# Patient Record
Sex: FEMALE | Employment: FULL TIME | URBAN - METROPOLITAN AREA
[De-identification: names, ages, dates, MRNs, and addresses within clinical notes are randomized per-mention and may not be internally consistent; named-entity substitution may affect disease eponyms.]

---

## 2022-11-06 ENCOUNTER — PATIENT MESSAGE (OUTPATIENT)
Dept: OBGYN CLINIC | Age: 32
End: 2022-11-06

## 2022-11-11 ENCOUNTER — OFFICE VISIT (OUTPATIENT)
Dept: OBGYN CLINIC | Age: 32
End: 2022-11-11
Payer: MEDICAID

## 2022-11-11 VITALS — WEIGHT: 269.8 LBS | DIASTOLIC BLOOD PRESSURE: 77 MMHG | BODY MASS INDEX: 42.26 KG/M2 | SYSTOLIC BLOOD PRESSURE: 108 MMHG

## 2022-11-11 DIAGNOSIS — Z01.419 ENCOUNTER FOR GYNECOLOGICAL EXAMINATION (GENERAL) (ROUTINE) WITHOUT ABNORMAL FINDINGS: Primary | ICD-10-CM

## 2022-11-11 PROCEDURE — 99385 PREV VISIT NEW AGE 18-39: CPT | Performed by: OBSTETRICS & GYNECOLOGY

## 2022-11-11 RX ORDER — NORGESTIMATE AND ETHINYL ESTRADIOL 0.25-0.035
1 KIT ORAL DAILY
COMMUNITY
Start: 2022-08-12

## 2022-11-11 RX ORDER — NORGESTIMATE AND ETHINYL ESTRADIOL 0.25-0.035
1 KIT ORAL DAILY
Qty: 3 DOSE PACK | Refills: 4 | Status: SHIPPED | OUTPATIENT
Start: 2022-11-11 | End: 2023-02-09

## 2022-11-11 RX ORDER — NORGESTIMATE AND ETHINYL ESTRADIOL 0.25-0.035
1 KIT ORAL DAILY
Qty: 1 DOSE PACK | Refills: 4 | Status: CANCELLED | OUTPATIENT
Start: 2022-11-11

## 2022-11-11 NOTE — PROGRESS NOTES
[facility]  [email or web address]  [phone]    Los Mercedes MD, FACOG     Annual Gynecologic Exam:  [facility] <40  Chief Complaint   Patient presents with    Well Woman         Harshal Stout is a 29 y.o. [de-identified]  UNAVAILABLE female who presents for an annual well woman exam.  Patient's last menstrual period was 2022. Sherryle Moder She reports the following additional concerns: wants STD testing , if covered. Menstrual status:  She does not report dysmenorrhea/painful menses. She does not report heavy menses. She does not report irregular bleeding. Sexual history and Contraception:  Social History     Substance and Sexual Activity   Sexual Activity Yes    Partners: Male    Birth control/protection: Pill       She does not reports new sexual partner(s) in the last year. Preventive Medicine History:  Her most recent Pap smear result: normal was obtained in 2021  Her most recent HR HPV screen was Negative obtained in     She does not have a history of MELVIN 2, 3 or cervical cancer. Past Medical History:   Diagnosis Date    Psoriasis      OB History    Para Term  AB Living   0 0 0 0 0 0   SAB IAB Ectopic Molar Multiple Live Births   0 0 0 0 0 0     Past Surgical History:   Procedure Laterality Date    HX TONSILLECTOMY       History reviewed. No pertinent family history.   Social History     Socioeconomic History    Marital status:      Spouse name: Not on file    Number of children: Not on file    Years of education: Not on file    Highest education level: Not on file   Occupational History    Not on file   Tobacco Use    Smoking status: Never    Smokeless tobacco: Never   Vaping Use    Vaping Use: Every day    Substances: Nicotine   Substance and Sexual Activity    Alcohol use: Yes     Comment: social    Drug use: Never    Sexual activity: Yes     Partners: Male     Birth control/protection: Pill   Other Topics Concern    Not on file   Social History Narrative    Not on file     Social Determinants of Health     Financial Resource Strain: Not on file   Food Insecurity: Not on file   Transportation Needs: Not on file   Physical Activity: Not on file   Stress: Not on file   Social Connections: Not on file   Intimate Partner Violence: Not on file   Housing Stability: Not on file       Allergies   Allergen Reactions    Other Medication Unknown (comments)     Peanuts       Current Outpatient Medications   Medication Sig    Pauline 0.25-35 mg-mcg tab Take 1 Tablet by mouth daily. norgestimate-ethinyl estradioL (ORTHO-CYCLEN, SPRINTEC) 0.25-35 mg-mcg tab Take 1 Tablet by mouth daily for 90 days. FEXOFENADINE HCL (ALLEGRA PO) Take  by mouth. (Patient not taking: Reported on 11/11/2022)    METHYLPHENIDATE HCL (CONCERTA PO) Take  by mouth. (Patient not taking: Reported on 11/11/2022)     No current facility-administered medications for this visit. There is no problem list on file for this patient. Review of Systems - History obtained from the patient and patient filled out questionnaire   Constitutional/general, HEENT, CV, Resp, GI, MSK, Neuro, Psych, Heme/lymph, Skin, Breast ROS: no significant complaints except as noted on HPI    Physical Exam  Visit Vitals  /77   Wt 269 lb 12.8 oz (122.4 kg)   LMP 11/03/2022   BMI 42.26 kg/m²       Constitutional  Appearance: well-nourished, well developed, alert, in no acute distress    HENT  Head and Face: appears normal    Neck  Inspection/Palpation: normal appearance, no masses or tenderness  Lymph Nodes: no lymphadenopathy present  Thyroid: gland size normal, nontender, no nodules or masses present on palpation    Chest  Respiratory Effort: breathing unlabored  Auscultation: normal breath sounds    Cardiovascular  Heart:   Auscultation: regular rate and rhythm without murmur    Breasts  Inspection of Breasts: breasts symmetrical, no skin changes, no discharge present, nipple appearance normal, no skin retraction present  Palpation of Breasts and Axillae: no masses present on palpation, no breast tenderness  Axillary Lymph Nodes: no lymphadenopathy present    Gastrointestinal  Abdominal Examination: abdomen non-tender to palpation, normal bowel sounds, no masses present  Liver and spleen: no hepatomegaly present, spleen not palpable  Hernias: no hernias identified    Genitourinary  External Genitalia: normal appearance for age, no discharge present, no tenderness present, no inflammatory lesions present, no masses present  Vagina: normal vaginal vault without central or paravaginal defects, thin white discharge present, no inflammatory lesions present, no masses present  Bladder: non-tender to palpation  Urethra: appears normal  Cervix: normal   Uterus: normal size, shape and consistency  Adnexa: no adnexal tenderness present, no adnexal masses present  Perineum: perineum within normal limits, no evidence of trauma, no rashes or skin lesions present  Anus: anus within normal limits, no hemorrhoids present  Inguinal Lymph Nodes: no lymphadenopathy present    Skin  General Inspection: no rash, no lesions identified    Neurologic/Psychiatric  Mental Status:  Orientation: grossly oriented to person, place and time  Mood and Affect: mood normal, affect appropriate    Assessment:  29 y.o. [de-identified]  for well woman exam  Encounter Diagnosis   Name Primary? Encounter for gynecological examination (general) (routine) without abnormal findings Yes       Plan:  The patient was counseled about diet, exercise, healthy lifestyle  We discussed current pap smear and HR HPV testing guidelines. I recommended follow up one year for routine annual gynecologic exam or sooner prn  Handouts were given to the patient  I recommended follow up with a primary care physician for chronic medical problems and evaluation of non-gynecologic concerns and to please contact our office with any GYN questions or concerns.   I recommended testing per CDC guidelines and at patient request.   Discussed risks, benefits and alternatives of OCP/nuvaring/patch: including but not limited to dvt/pe/mi/cva/ca/gi risks and that smoking, increasing age and other health conditions can increase these risks. Disc options for STD testing, pt is going to check on coverage first. Hold for today. Folllow up:  [x] return for annual well woman exam in one year or sooner if she is having problems  [] follow up and ultrasound  [] 6 months  [] 3 months  [] 6 weeks   [] 1 month    Orders Placed This Encounter    norgestimate-ethinyl estradioL (ORTHO-CYCLEN, [facility]) 0.25-35 mg-mcg tab    PAP IG, APTIMA HPV AND RFX 16/18,45 ([id number])       No results found for any visits on 11/11/22.

## 2022-11-19 LAB
CYTOLOGIST CVX/VAG CYTO: ABNORMAL
CYTOLOGY CVX/VAG DOC CYTO: ABNORMAL
CYTOLOGY CVX/VAG DOC THIN PREP: ABNORMAL
CYTOLOGY HISTORY:: ABNORMAL
DX ICD CODE: ABNORMAL
DX ICD CODE: ABNORMAL
HPV GENOTYPE REFLEX: ABNORMAL
HPV I/H RISK 4 DNA CVX QL PROBE+SIG AMP: POSITIVE
Lab: ABNORMAL
OTHER STN SPEC: ABNORMAL
PATHOLOGIST CVX/VAG CYTO: ABNORMAL
STAT OF ADQ CVX/VAG CYTO-IMP: ABNORMAL

## 2022-11-20 NOTE — PROGRESS NOTES
Pap smear abnormal, recommend colposcopy within four weeks. Update PMH: date/cytology/HPV (pos/neg) if done  Notify patient if MyChart not read or not active. Rec premedicate with 600mg Ibuprofen, if no contraindication (for example: pregnancy/allergy).   TICKLE until colpo completed  Ascus hpv pos

## 2022-11-29 ENCOUNTER — TELEPHONE (OUTPATIENT)
Dept: OBGYN CLINIC | Age: 32
End: 2022-11-29

## 2022-11-29 NOTE — PROGRESS NOTES
Follow up call to Ms. Zoraida Thao - Pt verified self and birth date for privacy precautions. Patient was advised of results & need for colpo. Colpo scheduled for next avail 1/19/23  Advised I don't know if it will be covered by insurance, advised to reach out to her insurance herself, advised we would be providing appropriate dx codes to help coverage, advised the same regarding STD testing. Advised HPV is sexually transmitted but it is a virus & may lay dormant in her system for an unknown amount of time. Advised I did not believe there was a test to check & see who gave her the HPV. Advised ASCUS could be d/t inflammation, infection, hormonal change, benign variation. Reviewed colpo procedure & protocols with pt. Ms. Zoraida Thao acknowledged understanding and all questions were answered to patients satisfaction. No further questions or concerns at this time. Updated PMH & tickled for colpo.

## 2022-11-29 NOTE — TELEPHONE ENCOUNTER
Follow up call to MsMicheline Heaven Chiu - Pt verified self and birth date for privacy precautions. Patient was advised of results & need for colpo. Colpo scheduled for next avail 1/19/23  Advised I don't know if it will be covered by insurance, advised to reach out to her insurance herself, advised we would be providing appropriate dx codes to help coverage, advised the same regarding STD testing. Advised HPV is sexually transmitted but it is a virus & may lay dormant in her system for an unknown amount of time. Advised I did not believe there was a test to check & see who gave her the HPV. Advised ASCUS could be d/t inflammation, infection, hormonal change, benign variation. Reviewed colpo procedure & protocols with pt. Ms. Heaven Chiu acknowledged understanding and all questions were answered to patients satisfaction. No further questions or concerns at this time. Updated PMH & tickled for colpo.

## 2022-11-29 NOTE — TELEPHONE ENCOUNTER
----- Message from Alejandra Meyers MD sent at 11/19/2022 10:39 PM EST -----  Pap smear abnormal, recommend colposcopy within four weeks. Update PMH: date/cytology/HPV (pos/neg) if done  Notify patient if MyChart not read or not active. Rec premedicate with 600mg Ibuprofen, if no contraindication (for example: pregnancy/allergy).   TICKLE until colpo completed  Ascus hpv pos

## 2023-02-12 ENCOUNTER — PATIENT MESSAGE (OUTPATIENT)
Dept: OBGYN CLINIC | Age: 33
End: 2023-02-12

## 2023-05-21 RX ORDER — NORGESTIMATE AND ETHINYL ESTRADIOL 0.25-0.035
1 KIT ORAL DAILY
COMMUNITY
Start: 2022-08-12

## 2023-07-25 ENCOUNTER — TELEPHONE (OUTPATIENT)
Age: 33
End: 2023-07-25

## 2023-08-28 ENCOUNTER — PROCEDURE VISIT (OUTPATIENT)
Age: 33
End: 2023-08-28
Payer: COMMERCIAL

## 2023-08-28 VITALS — SYSTOLIC BLOOD PRESSURE: 119 MMHG | DIASTOLIC BLOOD PRESSURE: 74 MMHG | WEIGHT: 254 LBS | BODY MASS INDEX: 39.78 KG/M2

## 2023-08-28 DIAGNOSIS — Z20.2 STD EXPOSURE: ICD-10-CM

## 2023-08-28 DIAGNOSIS — Z11.3 VENEREAL DISEASE SCREENING: ICD-10-CM

## 2023-08-28 DIAGNOSIS — R87.810 ASCUS WITH POSITIVE HIGH RISK HPV CERVICAL: Primary | ICD-10-CM

## 2023-08-28 DIAGNOSIS — R87.610 ASCUS WITH POSITIVE HIGH RISK HPV CERVICAL: Primary | ICD-10-CM

## 2023-08-28 DIAGNOSIS — Z01.812 PRE-PROCEDURE LAB EXAM: ICD-10-CM

## 2023-08-28 PROCEDURE — 57454 BX/CURETT OF CERVIX W/SCOPE: CPT | Performed by: OBSTETRICS & GYNECOLOGY

## 2023-08-28 NOTE — PROGRESS NOTES
Luz Webb is a 34 y.o. female presents for a problem visit. Chief Complaint   Patient presents with    Colposcopy       Problems: Abnormal Pap       Patient's last menstrual period was 08/10/2023. Birth Control: OCP (estrogen/progesterone). Patient had abnormal pap 11/11/2022 ASCUS/HPV pos. Last Pap: abnormal obtained 1 year(s) ago  Not currently taking a prenatal or folic vitamin. Occasional tobacco user  History of STD? HPV positive  Age she became sexually active ? 14  How many life partners ? 10          Chart reviewed for the following:   Jermain DICKSON LPN, have reviewed the History, Physical and updated the Allergic reactions for [address] performed immediately prior to start of procedure:   Jermain DICKSON LPN, have performed the following reviews on Luz Webb prior to the start of the procedure:            * Patient was identified by name and date of birth   * Agreement on procedure being performed was verified  * Risks and Benefits explained to the patient  * Procedure site verified and marked as necessary  * Patient was positioned for comfort  * Consent was signed and verified     Time: 5135    Date of procedure: 8/28/2023    Procedure performed by: Amarilis Ho MD       Provider assisted by: Jermain Rosado LPN    Patient assisted by: self    How tolerated by patient: tolerated the procedure well with no complications    Post Procedural Pain Scale: 0 - No Hurt    Comments: none    Examination chaperoned by Jermain Rosado LPN.
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
## 2023-08-29 LAB
HBV SURFACE AG SER QL: <0.1 INDEX
HBV SURFACE AG SER QL: NEGATIVE
HIV 1+2 AB+HIV1 P24 AG SERPL QL IA: NONREACTIVE
HIV 1/2 RESULT COMMENT: NORMAL

## 2023-08-30 LAB
C TRACH RRNA SPEC QL NAA+PROBE: NEGATIVE
N GONORRHOEA RRNA SPEC QL NAA+PROBE: NEGATIVE
T PALLIDUM AB SER QL IA: NON REACTIVE
T VAGINALIS RRNA SPEC QL NAA+PROBE: NEGATIVE

## 2023-09-01 LAB
CPT BILLING CODE: NORMAL
DIAGNOSIS SYNOPSIS:: NORMAL
DX ICD CODE: NORMAL
DX ICD CODE: NORMAL
PATH REPORT.FINAL DX SPEC: NORMAL
PATH REPORT.GROSS SPEC: NORMAL
PATH REPORT.RELEVANT HX SPEC: NORMAL
PATH REPORT.SITE OF ORIGIN SPEC: NORMAL
PATHOLOGIST NAME: NORMAL
PAYMENT PROCEDURE: NORMAL

## 2023-11-30 ENCOUNTER — TELEPHONE (OUTPATIENT)
Age: 33
End: 2023-11-30

## 2023-11-30 RX ORDER — NORGESTIMATE AND ETHINYL ESTRADIOL 0.25-0.035
1 KIT ORAL DAILY
Qty: 1 PACKET | Refills: 1 | Status: SHIPPED | OUTPATIENT
Start: 2023-11-30

## 2023-11-30 NOTE — TELEPHONE ENCOUNTER
Patient was advised of MD sent prescription to her pharmacy to get her to her pharmacy    Patient verbalized understanding.

## 2023-11-30 NOTE — TELEPHONE ENCOUNTER
Two patient identifiers used      34year old patient last seen in the office on 11/11/2022 for ae    Patient calling for a refill of her ocp    Patient was advised of need for ae and was placed on the schedule to be seen on 1/29/2023 at 10:20am      Patient was advised of need to be seen every year to make sure she is still ok to be prescribed ocp  Patient was advised of need to keep appointment in order to get further refills    Prescription pended for amend/sign       Thank you

## 2024-01-15 RX ORDER — NORGESTIMATE AND ETHINYL ESTRADIOL 0.25-0.035
1 KIT ORAL DAILY
Qty: 28 TABLET | Refills: 1 | OUTPATIENT
Start: 2024-01-15

## 2024-01-26 SDOH — ECONOMIC STABILITY: FOOD INSECURITY: WITHIN THE PAST 12 MONTHS, THE FOOD YOU BOUGHT JUST DIDN'T LAST AND YOU DIDN'T HAVE MONEY TO GET MORE.: NEVER TRUE

## 2024-01-26 SDOH — ECONOMIC STABILITY: FOOD INSECURITY: WITHIN THE PAST 12 MONTHS, YOU WORRIED THAT YOUR FOOD WOULD RUN OUT BEFORE YOU GOT MONEY TO BUY MORE.: NEVER TRUE

## 2024-01-26 SDOH — ECONOMIC STABILITY: INCOME INSECURITY: HOW HARD IS IT FOR YOU TO PAY FOR THE VERY BASICS LIKE FOOD, HOUSING, MEDICAL CARE, AND HEATING?: NOT HARD AT ALL

## 2024-01-26 SDOH — ECONOMIC STABILITY: TRANSPORTATION INSECURITY
IN THE PAST 12 MONTHS, HAS LACK OF TRANSPORTATION KEPT YOU FROM MEETINGS, WORK, OR FROM GETTING THINGS NEEDED FOR DAILY LIVING?: NO

## 2024-01-26 SDOH — ECONOMIC STABILITY: HOUSING INSECURITY
IN THE LAST 12 MONTHS, WAS THERE A TIME WHEN YOU DID NOT HAVE A STEADY PLACE TO SLEEP OR SLEPT IN A SHELTER (INCLUDING NOW)?: NO

## 2024-01-26 ASSESSMENT — PATIENT HEALTH QUESTIONNAIRE - PHQ9
1. LITTLE INTEREST OR PLEASURE IN DOING THINGS: 0
7. TROUBLE CONCENTRATING ON THINGS, SUCH AS READING THE NEWSPAPER OR WATCHING TELEVISION: NOT AT ALL
SUM OF ALL RESPONSES TO PHQ QUESTIONS 1-9: 0
5. POOR APPETITE OR OVEREATING: NOT AT ALL
6. FEELING BAD ABOUT YOURSELF - OR THAT YOU ARE A FAILURE OR HAVE LET YOURSELF OR YOUR FAMILY DOWN: 0
1. LITTLE INTEREST OR PLEASURE IN DOING THINGS: NOT AT ALL
4. FEELING TIRED OR HAVING LITTLE ENERGY: NOT AT ALL
SUM OF ALL RESPONSES TO PHQ QUESTIONS 1-9: 0
5. POOR APPETITE OR OVEREATING: 0
SUM OF ALL RESPONSES TO PHQ QUESTIONS 1-9: 0
SUM OF ALL RESPONSES TO PHQ9 QUESTIONS 1 & 2: 0
6. FEELING BAD ABOUT YOURSELF - OR THAT YOU ARE A FAILURE OR HAVE LET YOURSELF OR YOUR FAMILY DOWN: NOT AT ALL
10. IF YOU CHECKED OFF ANY PROBLEMS, HOW DIFFICULT HAVE THESE PROBLEMS MADE IT FOR YOU TO DO YOUR WORK, TAKE CARE OF THINGS AT HOME, OR GET ALONG WITH OTHER PEOPLE: NOT DIFFICULT AT ALL
3. TROUBLE FALLING OR STAYING ASLEEP: 0
SUM OF ALL RESPONSES TO PHQ QUESTIONS 1-9: 0
9. THOUGHTS THAT YOU WOULD BE BETTER OFF DEAD, OR OF HURTING YOURSELF: 0
4. FEELING TIRED OR HAVING LITTLE ENERGY: 0
7. TROUBLE CONCENTRATING ON THINGS, SUCH AS READING THE NEWSPAPER OR WATCHING TELEVISION: 0
8. MOVING OR SPEAKING SO SLOWLY THAT OTHER PEOPLE COULD HAVE NOTICED. OR THE OPPOSITE, BEING SO FIGETY OR RESTLESS THAT YOU HAVE BEEN MOVING AROUND A LOT MORE THAN USUAL: 0
2. FEELING DOWN, DEPRESSED OR HOPELESS: 0
10. IF YOU CHECKED OFF ANY PROBLEMS, HOW DIFFICULT HAVE THESE PROBLEMS MADE IT FOR YOU TO DO YOUR WORK, TAKE CARE OF THINGS AT HOME, OR GET ALONG WITH OTHER PEOPLE: 0
SUM OF ALL RESPONSES TO PHQ QUESTIONS 1-9: 0
8. MOVING OR SPEAKING SO SLOWLY THAT OTHER PEOPLE COULD HAVE NOTICED. OR THE OPPOSITE - BEING SO FIDGETY OR RESTLESS THAT YOU HAVE BEEN MOVING AROUND A LOT MORE THAN USUAL: NOT AT ALL
9. THOUGHTS THAT YOU WOULD BE BETTER OFF DEAD, OR OF HURTING YOURSELF: NOT AT ALL
3. TROUBLE FALLING OR STAYING ASLEEP: NOT AT ALL
2. FEELING DOWN, DEPRESSED OR HOPELESS: NOT AT ALL

## 2024-01-29 ENCOUNTER — OFFICE VISIT (OUTPATIENT)
Age: 34
End: 2024-01-29
Payer: MEDICAID

## 2024-01-29 VITALS
SYSTOLIC BLOOD PRESSURE: 129 MMHG | HEIGHT: 67 IN | BODY MASS INDEX: 39.46 KG/M2 | WEIGHT: 251.4 LBS | HEART RATE: 86 BPM | DIASTOLIC BLOOD PRESSURE: 84 MMHG

## 2024-01-29 DIAGNOSIS — Z12.4 CERVICAL CANCER SCREENING: ICD-10-CM

## 2024-01-29 DIAGNOSIS — Z98.890 HX OF COLPOSCOPY WITH CERVICAL BIOPSY: ICD-10-CM

## 2024-01-29 DIAGNOSIS — Z11.51 ENCOUNTER FOR SCREENING FOR HUMAN PAPILLOMAVIRUS (HPV): ICD-10-CM

## 2024-01-29 DIAGNOSIS — Z01.419 ENCOUNTER FOR GYNECOLOGICAL EXAMINATION: Primary | ICD-10-CM

## 2024-01-29 DIAGNOSIS — Z87.42 H/O ABNORMAL CERVICAL PAPANICOLAOU SMEAR: ICD-10-CM

## 2024-01-29 PROCEDURE — 99395 PREV VISIT EST AGE 18-39: CPT | Performed by: OBSTETRICS & GYNECOLOGY

## 2024-01-29 RX ORDER — BUPROPION HYDROCHLORIDE 100 MG/1
TABLET, EXTENDED RELEASE ORAL
COMMUNITY

## 2024-01-29 RX ORDER — IBUPROFEN 800 MG/1
TABLET ORAL
COMMUNITY

## 2024-01-29 RX ORDER — DIPHENHYDRAMINE HCL 25 MG
TABLET ORAL
COMMUNITY

## 2024-01-29 RX ORDER — NORGESTIMATE AND ETHINYL ESTRADIOL 0.25-0.035
1 KIT ORAL DAILY
Qty: 3 PACKET | Refills: 4 | Status: SHIPPED | OUTPATIENT
Start: 2024-01-29

## 2024-01-29 NOTE — PROGRESS NOTES
Almas Alonzo is a 33 y.o. female returns for an annual exam     Chief Complaint   Patient presents with    Annual Exam       Patient's last menstrual period was 01/25/2024.  Her periods are light in flow and usually regular with a 26-32 day interval with 3-7 day duration.  She does not have dysmenorrhea.  Problems: no problems, happy with OCP  Birth Control: OCP (estrogen/progesterone), Ortho Cyclen 0.25-35  Last Pap: 11/11/22 ASCUS/HPV pos, colpo 8/28/23 Colpo pathology:ECC:neg, ectocx:neg; repap today.   She does not have a history of ROM 2, 3 or cervical cancer.   With regard to the Gardisil vaccine, she has received all 3 injections        Examination chaperoned by Tatiana Barragan MA.

## 2024-01-29 NOTE — PROGRESS NOTES
[facility]  [email or web address]  [phone]    Sharmila Pop MD, FACOG        Annual Gynecologic Exam:  Chief Complaint   Patient presents with    Annual Exam       Almas Alonzo is a No obstetric history on file.,  33 y.o. female   Patient's last menstrual period was 2024.    She presents for her annual checkup.     She is having no problems.  We disc timing of rp pap with colpo done in August, t wants to proceed with fu pap today      Per Rooming Note:  Patient's last menstrual period was 2024.  Her periods are light in flow and usually regular with a 26-32 day interval with 3-7 day duration.  She does not have dysmenorrhea.  Problems: no problems, happy with OCP  Birth Control: OCP (estrogen/progesterone), Ortho Cyclen 0.25-35  Last Pap: 22 ASCUS/HPV pos, colpo 23 Colpo pathology:ECC:neg, ectocx:neg; repap today.   She does not have a history of ROM 2, 3 or cervical cancer.   With regard to the Gardisil vaccine, she has received all 3 injections    Sexual history and Contraception:    Social History     Substance and Sexual Activity   Sexual Activity Yes    Partners: Male    Birth control/protection: Pill      Past Medical History:   Diagnosis Date    Abnormal Pap smear of cervix 2022    ascus HPV pos - colpo needed    Asthma 1996    Autoimmune disorder (HCC) 1992    Psoriasis    Depression Early     Migraine Early     Psoriasis      Current Outpatient Medications   Medication Sig Dispense Refill    norgestimate-ethinyl estradiol (ORTHO-CYCLEN) 0.25-35 MG-MCG per tablet Take 1 tablet by mouth daily 3 packet 4    buPROPion (WELLBUTRIN SR) 100 MG extended release tablet       diphenhydrAMINE (ALLERGY) 25 MG tablet       ibuprofen (ADVIL;MOTRIN) 800 MG tablet        No current facility-administered medications for this visit.      OB History    Para Term  AB Living   0   0 0 0 0   SAB IAB Ectopic Molar Multiple

## 2024-02-03 LAB
CYTOLOGIST CVX/VAG CYTO: ABNORMAL
CYTOLOGY CVX/VAG DOC CYTO: ABNORMAL
CYTOLOGY CVX/VAG DOC THIN PREP: ABNORMAL
DX ICD CODE: ABNORMAL
HPV GENOTYPE REFLEX: ABNORMAL
HPV I/H RISK 4 DNA CVX QL PROBE+SIG AMP: POSITIVE
HPV16 DNA CVX QL PROBE+SIG AMP: NEGATIVE
HPV18+45 E6+E7 MRNA CVX QL NAA+PROBE: NEGATIVE
Lab: ABNORMAL
OTHER STN SPEC: ABNORMAL
STAT OF ADQ CVX/VAG CYTO-IMP: ABNORMAL